# Patient Record
Sex: MALE | Race: WHITE | Employment: UNEMPLOYED | URBAN - METROPOLITAN AREA
[De-identification: names, ages, dates, MRNs, and addresses within clinical notes are randomized per-mention and may not be internally consistent; named-entity substitution may affect disease eponyms.]

---

## 2023-02-25 ENCOUNTER — HOSPITAL ENCOUNTER (EMERGENCY)
Age: 23
Discharge: PSYCHIATRIC OR SUBSTANCE USE FACILITY | End: 2023-02-26
Attending: EMERGENCY MEDICINE
Payer: MEDICARE

## 2023-02-25 ENCOUNTER — APPOINTMENT (OUTPATIENT)
Dept: GENERAL RADIOLOGY | Age: 23
End: 2023-02-25
Payer: MEDICARE

## 2023-02-25 DIAGNOSIS — R42 DIZZINESS: ICD-10-CM

## 2023-02-25 DIAGNOSIS — R07.89 CHEST WALL PAIN: ICD-10-CM

## 2023-02-25 DIAGNOSIS — I95.1 ORTHOSTATIC HYPOTENSION: ICD-10-CM

## 2023-02-25 DIAGNOSIS — R11.2 NAUSEA AND VOMITING, UNSPECIFIED VOMITING TYPE: Primary | ICD-10-CM

## 2023-02-25 PROCEDURE — 99285 EMERGENCY DEPT VISIT HI MDM: CPT

## 2023-02-25 PROCEDURE — 96375 TX/PRO/DX INJ NEW DRUG ADDON: CPT

## 2023-02-25 PROCEDURE — 96374 THER/PROPH/DIAG INJ IV PUSH: CPT

## 2023-02-25 PROCEDURE — 93005 ELECTROCARDIOGRAM TRACING: CPT | Performed by: EMERGENCY MEDICINE

## 2023-02-25 PROCEDURE — 71045 X-RAY EXAM CHEST 1 VIEW: CPT

## 2023-02-25 RX ORDER — DIVALPROEX SODIUM 250 MG/1
250 TABLET, DELAYED RELEASE ORAL 3 TIMES DAILY
COMMUNITY

## 2023-02-25 RX ORDER — PRAZOSIN HYDROCHLORIDE 1 MG/1
1 CAPSULE ORAL NIGHTLY
COMMUNITY

## 2023-02-25 RX ORDER — 0.9 % SODIUM CHLORIDE 0.9 %
1000 INTRAVENOUS SOLUTION INTRAVENOUS ONCE
Status: COMPLETED | OUTPATIENT
Start: 2023-02-25 | End: 2023-02-26

## 2023-02-25 RX ORDER — NICOTINE 21 MG/24HR
1 PATCH, TRANSDERMAL 24 HOURS TRANSDERMAL EVERY 24 HOURS
COMMUNITY

## 2023-02-25 RX ORDER — RISPERIDONE 2 MG/1
2 TABLET ORAL 2 TIMES DAILY
COMMUNITY

## 2023-02-26 ENCOUNTER — APPOINTMENT (OUTPATIENT)
Dept: CT IMAGING | Age: 23
End: 2023-02-26
Payer: MEDICARE

## 2023-02-26 VITALS
RESPIRATION RATE: 16 BRPM | SYSTOLIC BLOOD PRESSURE: 110 MMHG | HEART RATE: 66 BPM | OXYGEN SATURATION: 97 % | DIASTOLIC BLOOD PRESSURE: 65 MMHG | TEMPERATURE: 98 F

## 2023-02-26 LAB
ACETAMINOPHEN LEVEL: <5 MCG/ML (ref 10–30)
ALBUMIN SERPL-MCNC: 4.3 G/DL (ref 3.5–5.2)
ALP BLD-CCNC: 56 U/L (ref 40–129)
ALT SERPL-CCNC: 12 U/L (ref 0–40)
AMPHETAMINE SCREEN, URINE: NOT DETECTED
ANION GAP SERPL CALCULATED.3IONS-SCNC: 9 MMOL/L (ref 7–16)
AST SERPL-CCNC: 29 U/L (ref 0–39)
BARBITURATE SCREEN URINE: NOT DETECTED
BASOPHILS ABSOLUTE: 0.01 E9/L (ref 0–0.2)
BASOPHILS RELATIVE PERCENT: 0.2 % (ref 0–2)
BENZODIAZEPINE SCREEN, URINE: NOT DETECTED
BILIRUB SERPL-MCNC: 0.5 MG/DL (ref 0–1.2)
BUN BLDV-MCNC: 16 MG/DL (ref 6–20)
CALCIUM SERPL-MCNC: 9.5 MG/DL (ref 8.6–10.2)
CANNABINOID SCREEN URINE: POSITIVE
CHLORIDE BLD-SCNC: 102 MMOL/L (ref 98–107)
CO2: 28 MMOL/L (ref 22–29)
COCAINE METABOLITE SCREEN URINE: NOT DETECTED
CREAT SERPL-MCNC: 1.1 MG/DL (ref 0.7–1.2)
D DIMER: 1021 NG/ML DDU
EOSINOPHILS ABSOLUTE: 0.03 E9/L (ref 0.05–0.5)
EOSINOPHILS RELATIVE PERCENT: 0.5 % (ref 0–6)
ETHANOL: <10 MG/DL (ref 0–0.08)
FENTANYL SCREEN, URINE: NOT DETECTED
GFR SERPL CREATININE-BSD FRML MDRD: >60 ML/MIN/1.73
GLUCOSE BLD-MCNC: 97 MG/DL (ref 74–99)
HCT VFR BLD CALC: 37.9 % (ref 37–54)
HEMOGLOBIN: 13.9 G/DL (ref 12.5–16.5)
IMMATURE GRANULOCYTES #: 0.03 E9/L
IMMATURE GRANULOCYTES %: 0.5 % (ref 0–5)
LIPASE: 30 U/L (ref 13–60)
LYMPHOCYTES ABSOLUTE: 1.98 E9/L (ref 1.5–4)
LYMPHOCYTES RELATIVE PERCENT: 32 % (ref 20–42)
Lab: ABNORMAL
MCH RBC QN AUTO: 29.6 PG (ref 26–35)
MCHC RBC AUTO-ENTMCNC: 36.7 % (ref 32–34.5)
MCV RBC AUTO: 80.8 FL (ref 80–99.9)
METHADONE SCREEN, URINE: NOT DETECTED
MONOCYTES ABSOLUTE: 0.47 E9/L (ref 0.1–0.95)
MONOCYTES RELATIVE PERCENT: 7.6 % (ref 2–12)
NEUTROPHILS ABSOLUTE: 3.66 E9/L (ref 1.8–7.3)
NEUTROPHILS RELATIVE PERCENT: 59.2 % (ref 43–80)
OPIATE SCREEN URINE: NOT DETECTED
OXYCODONE URINE: NOT DETECTED
PDW BLD-RTO: 11.9 FL (ref 11.5–15)
PHENCYCLIDINE SCREEN URINE: NOT DETECTED
PLATELET # BLD: 248 E9/L (ref 130–450)
PMV BLD AUTO: 10.9 FL (ref 7–12)
POTASSIUM SERPL-SCNC: 3.7 MMOL/L (ref 3.5–5)
RBC # BLD: 4.69 E12/L (ref 3.8–5.8)
SALICYLATE, SERUM: <0.3 MG/DL (ref 0–30)
SODIUM BLD-SCNC: 139 MMOL/L (ref 132–146)
TOTAL PROTEIN: 6.8 G/DL (ref 6.4–8.3)
TRICYCLIC ANTIDEPRESSANTS SCREEN SERUM: NEGATIVE NG/ML
TROPONIN, HIGH SENSITIVITY: 7 NG/L (ref 0–11)
TROPONIN, HIGH SENSITIVITY: <6 NG/L (ref 0–11)
VALPROIC ACID LEVEL: 21 MCG/ML (ref 50–100)
WBC # BLD: 6.2 E9/L (ref 4.5–11.5)

## 2023-02-26 PROCEDURE — 70450 CT HEAD/BRAIN W/O DYE: CPT

## 2023-02-26 PROCEDURE — 82077 ASSAY SPEC XCP UR&BREATH IA: CPT

## 2023-02-26 PROCEDURE — 80307 DRUG TEST PRSMV CHEM ANLYZR: CPT

## 2023-02-26 PROCEDURE — 80179 DRUG ASSAY SALICYLATE: CPT

## 2023-02-26 PROCEDURE — 2580000003 HC RX 258: Performed by: EMERGENCY MEDICINE

## 2023-02-26 PROCEDURE — 85378 FIBRIN DEGRADE SEMIQUANT: CPT

## 2023-02-26 PROCEDURE — A4216 STERILE WATER/SALINE, 10 ML: HCPCS | Performed by: EMERGENCY MEDICINE

## 2023-02-26 PROCEDURE — 85025 COMPLETE CBC W/AUTO DIFF WBC: CPT

## 2023-02-26 PROCEDURE — 93005 ELECTROCARDIOGRAM TRACING: CPT | Performed by: EMERGENCY MEDICINE

## 2023-02-26 PROCEDURE — 2500000003 HC RX 250 WO HCPCS: Performed by: EMERGENCY MEDICINE

## 2023-02-26 PROCEDURE — 96375 TX/PRO/DX INJ NEW DRUG ADDON: CPT

## 2023-02-26 PROCEDURE — 6360000002 HC RX W HCPCS: Performed by: EMERGENCY MEDICINE

## 2023-02-26 PROCEDURE — 80053 COMPREHEN METABOLIC PANEL: CPT

## 2023-02-26 PROCEDURE — 80143 DRUG ASSAY ACETAMINOPHEN: CPT

## 2023-02-26 PROCEDURE — 84484 ASSAY OF TROPONIN QUANT: CPT

## 2023-02-26 PROCEDURE — 6360000004 HC RX CONTRAST MEDICATION: Performed by: RADIOLOGY

## 2023-02-26 PROCEDURE — 80164 ASSAY DIPROPYLACETIC ACD TOT: CPT

## 2023-02-26 PROCEDURE — 96374 THER/PROPH/DIAG INJ IV PUSH: CPT

## 2023-02-26 PROCEDURE — 83690 ASSAY OF LIPASE: CPT

## 2023-02-26 PROCEDURE — 74176 CT ABD & PELVIS W/O CONTRAST: CPT

## 2023-02-26 PROCEDURE — 71275 CT ANGIOGRAPHY CHEST: CPT

## 2023-02-26 RX ORDER — 0.9 % SODIUM CHLORIDE 0.9 %
1000 INTRAVENOUS SOLUTION INTRAVENOUS ONCE
Status: COMPLETED | OUTPATIENT
Start: 2023-02-26 | End: 2023-02-26

## 2023-02-26 RX ORDER — KETOROLAC TROMETHAMINE 30 MG/ML
30 INJECTION, SOLUTION INTRAMUSCULAR; INTRAVENOUS ONCE
Status: COMPLETED | OUTPATIENT
Start: 2023-02-26 | End: 2023-02-26

## 2023-02-26 RX ORDER — ONDANSETRON 2 MG/ML
4 INJECTION INTRAMUSCULAR; INTRAVENOUS ONCE
Status: COMPLETED | OUTPATIENT
Start: 2023-02-26 | End: 2023-02-26

## 2023-02-26 RX ADMIN — SODIUM CHLORIDE 1000 ML: 9 INJECTION, SOLUTION INTRAVENOUS at 00:36

## 2023-02-26 RX ADMIN — FAMOTIDINE 20 MG: 10 INJECTION, SOLUTION INTRAVENOUS at 08:15

## 2023-02-26 RX ADMIN — ONDANSETRON 4 MG: 2 INJECTION INTRAMUSCULAR; INTRAVENOUS at 08:15

## 2023-02-26 RX ADMIN — SODIUM CHLORIDE 1000 ML: 9 INJECTION, SOLUTION INTRAVENOUS at 07:56

## 2023-02-26 RX ADMIN — IOPAMIDOL 75 ML: 755 INJECTION, SOLUTION INTRAVENOUS at 01:59

## 2023-02-26 RX ADMIN — KETOROLAC TROMETHAMINE 30 MG: 30 INJECTION, SOLUTION INTRAMUSCULAR; INTRAVENOUS at 08:14

## 2023-02-26 ASSESSMENT — PAIN - FUNCTIONAL ASSESSMENT
PAIN_FUNCTIONAL_ASSESSMENT: NONE - DENIES PAIN
PAIN_FUNCTIONAL_ASSESSMENT: NONE - DENIES PAIN

## 2023-02-26 ASSESSMENT — PAIN DESCRIPTION - ORIENTATION: ORIENTATION: RIGHT;LEFT

## 2023-02-26 ASSESSMENT — PAIN SCALES - GENERAL: PAINLEVEL_OUTOF10: 9

## 2023-02-26 ASSESSMENT — PAIN DESCRIPTION - LOCATION: LOCATION: ABDOMEN

## 2023-02-26 NOTE — ED NOTES
Called [facility] and spoke to patient's nurse ADARSH Beasley who states she will fax a copy of patient's pink slip to ED.      Gaby Gunter RN  02/25/23 4035

## 2023-02-26 NOTE — ED NOTES
As this RN is trying to medicate the pt in the room next to him he begins to shut out \"if you give that medicine to him  I will give you the evil eye. \"     Sugar Miguel RN  02/26/23 7471 Lillie Peres RN  02/26/23 4365

## 2023-02-26 NOTE — ED NOTES
Pt has been medically cleared by Dr. Mady Quevedo and d/c's back to [facility]    PAS by Sandeep 53 [phone] [facility]  02/26/23 8047

## 2023-02-26 NOTE — ED NOTES
Patient became extremely irritated when he was told by physician that he was being discharged. Patient became argumentative with this RN stating \"I can't go back there\". Patient then stomped his feet walking to the restroom and yelled that he was vomiting. This RN entered restroom and patient was not vomiting. No emesis noted in toilet. Patient then stated \"If you send me back and my organs shut down it will be on you\". Patient then returned to room and continued to be loud and disruptive to entire unit and then began arguing with other patients. [facility] ([facility] police called to bedside.       Amy Gonzalez RN  02/26/23 8403

## 2023-02-26 NOTE — ED NOTES
Patient asked for eula crackers. Informed patient he may not eat at this time due to CT ordered.       Geovani Brito RN  02/26/23 3391

## 2023-02-26 NOTE — ED NOTES
Patient stated \"I don't really want to go back to [facility] because I don't get any sleep over there\". Explained to patient that he is pink slipped to [facility].       Reid Philippe RN  02/26/23 3803

## 2023-02-26 NOTE — ED PROVIDER NOTES
HPI:  2/25/23, Time: 11:03 PM MERI Luther is a 26 y.o. male presenting to the ED for vomiting, beginning 1 day ago. The complaint has been persistent, moderate in severity, and worsened by nothing. Patient reporting nausea vomiting that is been going on for the past day. Patient reporting no fever no chills he does report some cough. Patient reporting no leg pain or swelling he reports no black or tarry stools he reports no hematemesis. Patient does report some pains in his chest more so with movement and palpation. Patient reporting he is not sure when he hit his head. Patient reporting no neck or back pain. Patient reporting he is on Depakote as well as Risperdal patient reports those are not new medications he is currently at [facility]. Patient is originally from [facility]. Patient reports that he does vape. He does not smoke tobacco.  Patient does smoke marijuana. Patient does not drink alcohol. Patient reporting no calf pain or swelling. ROS:   Pertinent positives and negatives are stated within HPI, all other systems reviewed and are negative.  --------------------------------------------- PAST HISTORY ---------------------------------------------  Past Medical History:  has no past medical history on file. Past Surgical History:  has no past surgical history on file. Social History:  reports current drug use. Drug: Marijuana Praveena Kim. Family History: family history is not on file. The patients home medications have been reviewed. Allergies: Patient has no known allergies.     ---------------------------------------------------PHYSICAL EXAM--------------------------------------    Constitutional/General: Alert and oriented x3, well appearing, non toxic in NAD  Head: Normocephalic and atraumatic  Eyes: PERRL, EOMI  Mouth: Oropharynx clear, handling secretions, no trismus  Neck: Supple, full ROM, non tender to palpation in the midline, no stridor, no crepitus, no meningeal signs  Pulmonary: Lungs clear to auscultation bilaterally, no wheezes, rales, or rhonchi. Not in respiratory distress  Cardiovascular:  Regular rate. Regular rhythm. No murmurs, gallops, or rubs. 2+ distal pulses  Chest: no chest wall tenderness  Abdomen: Soft. Non tender. Non distended. +BS. No rebound, guarding, or rigidity. No pulsatile masses appreciated. Musculoskeletal: Moves all extremities x 4. Warm and well perfused, no clubbing, cyanosis, or edema. Capillary refill <3 seconds  Skin: warm and dry. No rashes. Neurologic: GCS 15, CN 2-12 grossly intact, no focal deficits, symmetric strength 5/5 in the upper and lower extremities bilaterally  Psych: Normal Affect    -------------------------------------------------- RESULTS -------------------------------------------------  I have personally reviewed all laboratory and imaging results for this patient. Results are listed below.      LABS:  Results for orders placed or performed during the hospital encounter of 02/25/23   CBC with Auto Differential   Result Value Ref Range    WBC 6.2 4.5 - 11.5 E9/L    RBC 4.69 3.80 - 5.80 E12/L    Hemoglobin 13.9 12.5 - 16.5 g/dL    Hematocrit 37.9 37.0 - 54.0 %    MCV 80.8 80.0 - 99.9 fL    MCH 29.6 26.0 - 35.0 pg    MCHC 36.7 (H) 32.0 - 34.5 %    RDW 11.9 11.5 - 15.0 fL    Platelets 761 968 - 822 E9/L    MPV 10.9 7.0 - 12.0 fL    Neutrophils % 59.2 43.0 - 80.0 %    Immature Granulocytes % 0.5 0.0 - 5.0 %    Lymphocytes % 32.0 20.0 - 42.0 %    Monocytes % 7.6 2.0 - 12.0 %    Eosinophils % 0.5 0.0 - 6.0 %    Basophils % 0.2 0.0 - 2.0 %    Neutrophils Absolute 3.66 1.80 - 7.30 E9/L    Immature Granulocytes # 0.03 E9/L    Lymphocytes Absolute 1.98 1.50 - 4.00 E9/L    Monocytes Absolute 0.47 0.10 - 0.95 E9/L    Eosinophils Absolute 0.03 (L) 0.05 - 0.50 E9/L    Basophils Absolute 0.01 0.00 - 0.20 E9/L   Comprehensive Metabolic Panel   Result Value Ref Range    Sodium 139 132 - 146 mmol/L    Potassium 3.7 3.5 - 5.0 mmol/L    Chloride 102 98 - 107 mmol/L    CO2 28 22 - 29 mmol/L    Anion Gap 9 7 - 16 mmol/L    Glucose 97 74 - 99 mg/dL    BUN 16 6 - 20 mg/dL    Creatinine 1.1 0.7 - 1.2 mg/dL    Est, Glom Filt Rate >60 >=60 mL/min/1.73    Calcium 9.5 8.6 - 10.2 mg/dL    Total Protein 6.8 6.4 - 8.3 g/dL    Albumin 4.3 3.5 - 5.2 g/dL    Total Bilirubin 0.5 0.0 - 1.2 mg/dL    Alkaline Phosphatase 56 40 - 129 U/L    ALT 12 0 - 40 U/L    AST 29 0 - 39 U/L   Troponin   Result Value Ref Range    Troponin, High Sensitivity <6 0 - 11 ng/L   Lipase   Result Value Ref Range    Lipase 30 13 - 60 U/L   Serum Drug Screen   Result Value Ref Range    Ethanol Lvl <10 mg/dL    Acetaminophen Level <5.0 (L) 10.0 - 69.0 mcg/mL    Salicylate, Serum <4.5 0.0 - 30.0 mg/dL    TCA Scrn NEGATIVE Cutoff:300 ng/mL   URINE DRUG SCREEN   Result Value Ref Range    Amphetamine Screen, Urine NOT DETECTED Negative <1000 ng/mL    Barbiturate Screen, Ur NOT DETECTED Negative < 200 ng/mL    Benzodiazepine Screen, Urine NOT DETECTED Negative < 200 ng/mL    Cannabinoid Scrn, Ur POSITIVE (A) Negative < 50ng/mL    Cocaine Metabolite Screen, Urine NOT DETECTED Negative < 300 ng/mL    Opiate Scrn, Ur NOT DETECTED Negative < 300ng/mL    PCP Screen, Urine NOT DETECTED Negative < 25 ng/mL    Methadone Screen, Urine NOT DETECTED Negative <300 ng/mL    Oxycodone Urine NOT DETECTED Negative <100 ng/mL    FENTANYL SCREEN, URINE NOT DETECTED Negative <1 ng/mL    Drug Screen Comment: see below    D-Dimer, Quantitative   Result Value Ref Range    D-Dimer, Quant 1021 ng/mL DDU   Valproic Acid Level, Total   Result Value Ref Range    Valproic Acid Lvl 21 (L) 50 - 100 mcg/mL   Troponin   Result Value Ref Range    Troponin, High Sensitivity 7 0 - 11 ng/L   EKG 12 Lead   Result Value Ref Range    Ventricular Rate 55 BPM    Atrial Rate 55 BPM    P-R Interval 124 ms    QRS Duration 98 ms    Q-T Interval 458 ms    QTc Calculation (Bazett) 438 ms    P Axis 40 degrees    R Axis -30 degrees T Axis -16 degrees   EKG 12 Lead   Result Value Ref Range    Ventricular Rate 64 BPM    Atrial Rate 64 BPM    P-R Interval 130 ms    QRS Duration 98 ms    Q-T Interval 444 ms    QTc Calculation (Bazett) 458 ms    P Axis 19 degrees    R Axis 90 degrees    T Axis 71 degrees       RADIOLOGY:  Interpreted by Radiologist.  CT HEAD WO CONTRAST   Final Result   No acute intracranial abnormality. CTA CHEST W CONTRAST   Final Result   No evidence of pulmonary embolism or acute pulmonary abnormality. XR CHEST PORTABLE   Final Result   No acute process. CT ABDOMEN PELVIS WO CONTRAST Additional Contrast? None    (Results Pending)     EKG: This EKG is signed and interpreted by me. Rate: 55  Rhythm: Sinus  Interpretation: Inverted T waves in 3 and aVF, there is also noted T wave inversion V1 and V2  Comparison: no previous EKG available    REPEAT EKG: This EKG is signed and interpreted by ED Physician. Rate: 64  Rhythm: Sinus. Interpretation: no acute changes. Comparison: imProved from prior EKG.      ------------------------- NURSING NOTES AND VITALS REVIEWED ---------------------------   The nursing notes within the ED encounter and vital signs as below have been reviewed by myself. /66   Pulse 64   Temp 97.5 °F (36.4 °C) (Oral)   Resp 14   SpO2 97%   Oxygen Saturation Interpretation: Normal    The patients available past medical records and past encounters were reviewed.         ------------------------------ ED COURSE/MEDICAL DECISION MAKING----------------------  Medications   ketorolac (TORADOL) injection 30 mg (has no administration in time range)   0.9 % sodium chloride bolus (has no administration in time range)   ondansetron (ZOFRAN) injection 4 mg (has no administration in time range)   famotidine (PEPCID) 20 mg in sodium chloride (PF) 0.9 % 10 mL injection (has no administration in time range)   0.9 % sodium chloride bolus (0 mLs IntraVENous Stopped 2/26/23 0138) iopamidol (ISOVUE-370) 76 % injection 75 mL (75 mLs IntraVENous Given 2/26/23 0159)             Medical Decision Making:      History From: Patient presenting here because of nausea vomiting as well as weakness. Patient reportedly fell. Patient reporting no hematemesis he reports no black or tarry stools he reports no fever chills he reports no productive cough. Patient does report chest pain. Patient reporting no leg pain he reports no numbness or tingling. CC/HPI Summary, DDx, ED Course, Reassessment, Tests Considered, Patient expectation:   Patient with history of psychiatric disease presenting here because of feeling weak with nausea vomiting the whole day today. Patient reporting no black or tarry stools he reports no fever chills or productive cough. Patient reporting chest pain. Patient reporting no new medications he reports no leg pain or swelling. Patient reporting no neck or back pain. Patient is awake alert oriented heart lung exam normal abdomen is soft nontender. Patient neurologically intact. Patient vital signs stable. Differential includes dehydration as well as pancreatitis as well as PE as well as chest wall pain as well as vertigo  Patient orthostatics noted here in the emergency department EKG was sinus rhythm he did have some T wave inversion inferiorly but no ST elevation. Patient chest pain here is reproducible on midsternal his D-dimer was elevated CT of the chest was done to rule out PE that did not show any signs of PE drug screen was positive for marijuana CBC and electrolytes are within normal limits patient's lipase within normal limits as well as troponin was 7 he had 2 troponins that were within normal limits. I did repeat his EKG and EKG repeated shows sinus rhythm no T wave inversion no ST elevation.   While here in the emergency department the plan was to discharge home and patient was initially put on discharge but reassessed nurse called me and told me that patient was not feeling any better or he was actually worse on exam.  Patient was coming abdominal pain and on exam he was tender in his right upper and lower quadrant. CT ab pelvis was ordered as well as urinalysis. Patient was ordered Zofran 4 mg IV as well as Pepcid 20 mg IV as well as he was already ordered Toradol 30 mg IV for his chest wall pain and tenderness. Patient was made aware of plan. CT and pelvis within normal limits and for the lab work negative plan will be to discharge back to facility. Patient's vital signs are stable here  I will have the oncoming ED physician review CT abdomen pelvis if again it is in within normal limits plan will be to discharge. Social Determinants affecting Dx or Tx: Patient reports that he does vape he does smoke marijuana does not drink alcohol    Chronic Conditions: Psychiatric disease    Records Reviewed: Patient was seen at [facility] on February 12, 2023 for nausea vomiting. Patient from his past medical history has a history of ADHD history of autism history of bipolar disease. Patient was medicated there at outlying facility with potassium as well as droperidol. He was treated and released. Re-Evaluations:             Re-evaluation. Patients symptoms show no change  Did report improvement while here in the emergency room he was noted to be orthostatic I did review his vital signs. Patient was given to be discharged home and then started complaining of right-sided abdominal pain. Patient was ordered Zofran as well as Pepcid. Patient was given another liter of IV fluids here in the emergency department. Patient also given Toradol 30 mg IV for his chest pain. Patient will undergo CT of his abdomen pelvis as well as obtain urinalysis if those are within normal to make feeling that the patient can be discharged back to [facility]. Consultations:                 Critical Care:          This patient's ED course included: a personal history and physicial eaxmination    This patient has been closely monitored during their ED course. Counseling: The emergency provider has spoken with the patient and discussed todays results, in addition to providing specific details for the plan of care and counseling regarding the diagnosis and prognosis. Questions are answered at this time and they are agreeable with the plan.       --------------------------------- IMPRESSION AND DISPOSITION ---------------------------------    IMPRESSION  1. Nausea and vomiting, unspecified vomiting type    2. Dizziness    3. Orthostatic hypotension    4. Chest wall pain        DISPOSITION  Disposition: Discharge back to [facility]  Patient condition is stable        NOTE: This report was transcribed using voice recognition software.  Every effort was made to ensure accuracy; however, inadvertent computerized transcription errors may be present          Michael Cesar MD  02/26/23 202 Dragan Sibley MD  02/26/23 9860

## 2023-02-26 NOTE — ED NOTES
Pt talking loudly, being disruptive to unit, childish behavior, attempting to make himself throw up. Pt stating she he is a \"magical terrorist\" and plans to catch COVID-19 and get everyone sick.       MEAGAN Rubio, Odilon Tyson  02/26/23 7682

## 2023-02-26 NOTE — ED NOTES
Dr. Hailey Patel at bedside. Patient told physician Keyanna Parsons is one ring on my chest that if I push on it, it hurts\". Physician gave verbal order for Toradol 30mg IV.       Yamel Beck RN  02/26/23 8267

## 2023-02-26 NOTE — ED NOTES
Pt agreeable to discharge back to [facility] and contracts for safety     Dannie Perez RN  02/26/23 6643

## 2023-02-26 NOTE — ED NOTES
Patient continues to ask for eula crackers. Informed patient that his CT scans have not been resulted.       Madisyn Melvin RN  02/26/23 3079

## 2023-02-26 NOTE — ED NOTES
Pt given clear liquid diet for breakfast. Tolerating well negative nausea or vomiting noted denies abdominal pain     Pedro Pablolucas Santamaria, KATT  02/26/23 8898

## 2023-02-26 NOTE — ED NOTES
Patient reminded of need for urine specimen, specimen cup remains at bedside.       Beth Martinez RN  02/26/23 6109

## 2023-02-26 NOTE — ED NOTES
Patient drowsy. Patient states he was given his nighttime medications before coming to ED. Patient denies any emesis after taking those medications.       Nunu Napier RN  02/26/23 1889

## 2023-02-26 NOTE — ED NOTES
Patient asking for water. Explained to patient that he will be NPO since he came to ED for nausea and testing is ordered. Explained to patient that he can be given water once physician approves a diet.       Pia Page RN  02/25/23 9150

## 2023-02-26 NOTE — ED NOTES
Attempted to call report to [phone] however was transferred multiple times then phone hang up.  Therefore unable to give report     Flavia Galeano RN  02/26/23 7317

## 2023-02-26 NOTE — ED NOTES
Pt inquiring after fluids complete \"whats next?' informed of plan of care     Sugar Miguel RN  02/26/23 1525

## 2023-02-26 NOTE — ED NOTES
Informed by Ravi Olsen RN that patient is not here for a psychiatric evaluation but was placed in [facility] because he is pink slipped to [facility]. Pink slip was not sent to ED.           Ashwini Mueller RN  02/25/23 2040

## 2023-02-27 LAB
CHOLESTEROL, TOTAL: 110 MG/DL (ref 0–199)
EKG ATRIAL RATE: 55 BPM
EKG ATRIAL RATE: 64 BPM
EKG P AXIS: 19 DEGREES
EKG P AXIS: 40 DEGREES
EKG P-R INTERVAL: 124 MS
EKG P-R INTERVAL: 130 MS
EKG Q-T INTERVAL: 444 MS
EKG Q-T INTERVAL: 458 MS
EKG QRS DURATION: 98 MS
EKG QRS DURATION: 98 MS
EKG QTC CALCULATION (BAZETT): 438 MS
EKG QTC CALCULATION (BAZETT): 458 MS
EKG R AXIS: -30 DEGREES
EKG R AXIS: 90 DEGREES
EKG T AXIS: -16 DEGREES
EKG T AXIS: 71 DEGREES
EKG VENTRICULAR RATE: 55 BPM
EKG VENTRICULAR RATE: 64 BPM
HBA1C MFR BLD: 4.7 % (ref 4–5.6)
HDLC SERPL-MCNC: 31 MG/DL
LDL CHOLESTEROL CALCULATED: 69 MG/DL (ref 0–99)
TRIGL SERPL-MCNC: 50 MG/DL (ref 0–149)
VLDLC SERPL CALC-MCNC: 10 MG/DL

## 2023-02-27 PROCEDURE — 93010 ELECTROCARDIOGRAM REPORT: CPT | Performed by: INTERNAL MEDICINE
